# Patient Record
Sex: FEMALE | Race: WHITE
[De-identification: names, ages, dates, MRNs, and addresses within clinical notes are randomized per-mention and may not be internally consistent; named-entity substitution may affect disease eponyms.]

---

## 2020-09-03 ENCOUNTER — HOSPITAL ENCOUNTER (EMERGENCY)
Dept: HOSPITAL 92 - ERS | Age: 20
Discharge: HOME | End: 2020-09-03
Payer: COMMERCIAL
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
## 2020-09-03 DIAGNOSIS — S61.317A: Primary | ICD-10-CM

## 2020-09-03 PROCEDURE — 11760 REPAIR OF NAIL BED: CPT

## 2020-09-03 NOTE — RAD
EXAM: 3 views of the left small finger



HISTORY: Avulsion injury to the small finger nail after crush injury



COMPARISON: None



FINDINGS: There is no evidence of acute fracture or dislocation. Mild distal soft tissue swelling is 
seen. No degenerative changes are present. No radiopaque foreign body is seen.



IMPRESSION: No evidence of acute osseous abnormality.



Reported By: Watson Li 

Electronically Signed:  9/3/2020 7:43 AM